# Patient Record
Sex: FEMALE | Race: WHITE | NOT HISPANIC OR LATINO | Employment: FULL TIME | URBAN - METROPOLITAN AREA
[De-identification: names, ages, dates, MRNs, and addresses within clinical notes are randomized per-mention and may not be internally consistent; named-entity substitution may affect disease eponyms.]

---

## 2024-02-12 ENCOUNTER — OFFICE VISIT (OUTPATIENT)
Dept: ENDOCRINOLOGY | Facility: CLINIC | Age: 39
End: 2024-02-12
Payer: COMMERCIAL

## 2024-02-12 VITALS
SYSTOLIC BLOOD PRESSURE: 183 MMHG | WEIGHT: 293 LBS | DIASTOLIC BLOOD PRESSURE: 100 MMHG | HEIGHT: 67 IN | HEART RATE: 97 BPM | BODY MASS INDEX: 45.99 KG/M2

## 2024-02-12 DIAGNOSIS — I10 HYPERTENSION, UNSPECIFIED TYPE: ICD-10-CM

## 2024-02-12 DIAGNOSIS — E66.01 CLASS 3 SEVERE OBESITY DUE TO EXCESS CALORIES WITH SERIOUS COMORBIDITY AND BODY MASS INDEX (BMI) OF 50.0 TO 59.9 IN ADULT (MULTI): ICD-10-CM

## 2024-02-12 DIAGNOSIS — E11.65 TYPE 2 DIABETES MELLITUS WITH HYPERGLYCEMIA, WITHOUT LONG-TERM CURRENT USE OF INSULIN (MULTI): Primary | ICD-10-CM

## 2024-02-12 PROCEDURE — 99204 OFFICE O/P NEW MOD 45 MIN: CPT | Performed by: NURSE PRACTITIONER

## 2024-02-12 PROCEDURE — 3077F SYST BP >= 140 MM HG: CPT | Performed by: NURSE PRACTITIONER

## 2024-02-12 PROCEDURE — 4010F ACE/ARB THERAPY RXD/TAKEN: CPT | Performed by: NURSE PRACTITIONER

## 2024-02-12 PROCEDURE — 95249 CONT GLUC MNTR PT PROV EQP: CPT | Performed by: NURSE PRACTITIONER

## 2024-02-12 PROCEDURE — 3008F BODY MASS INDEX DOCD: CPT | Performed by: NURSE PRACTITIONER

## 2024-02-12 PROCEDURE — 3080F DIAST BP >= 90 MM HG: CPT | Performed by: NURSE PRACTITIONER

## 2024-02-12 RX ORDER — LOSARTAN POTASSIUM 25 MG/1
25 TABLET ORAL DAILY
COMMUNITY
End: 2024-02-12 | Stop reason: SDUPTHER

## 2024-02-12 RX ORDER — LOSARTAN POTASSIUM 25 MG/1
25 TABLET ORAL DAILY
Qty: 90 TABLET | Refills: 3 | Status: SHIPPED | OUTPATIENT
Start: 2024-02-12

## 2024-02-12 RX ORDER — METFORMIN HYDROCHLORIDE 1000 MG/1
1000 TABLET ORAL 2 TIMES DAILY
COMMUNITY
End: 2024-03-04 | Stop reason: SDUPTHER

## 2024-02-12 RX ORDER — BLOOD-GLUCOSE SENSOR
EACH MISCELLANEOUS
Qty: 2 EACH | Refills: 11 | Status: SHIPPED | OUTPATIENT
Start: 2024-02-12

## 2024-02-12 RX ORDER — BLOOD SUGAR DIAGNOSTIC
STRIP MISCELLANEOUS
Qty: 150 STRIP | Refills: 11 | Status: SHIPPED | OUTPATIENT
Start: 2024-02-12

## 2024-02-12 RX ORDER — SEMAGLUTIDE 0.68 MG/ML
0.5 INJECTION, SOLUTION SUBCUTANEOUS
Qty: 3 ML | Refills: 3 | Status: SHIPPED | OUTPATIENT
Start: 2024-02-12

## 2024-02-12 ASSESSMENT — ENCOUNTER SYMPTOMS
WEAKNESS: 0
DIZZINESS: 0
SLEEP DISTURBANCE: 0
FATIGUE: 0
POLYPHAGIA: 0
CONSTIPATION: 0
SHORTNESS OF BREATH: 0
POLYDIPSIA: 0
APPETITE CHANGE: 0
FREQUENCY: 0
NAUSEA: 0
PALPITATIONS: 0
SEIZURES: 0
NUMBNESS: 0
ACTIVITY CHANGE: 0
DIARRHEA: 0
NERVOUS/ANXIOUS: 0

## 2024-02-12 NOTE — PATIENT INSTRUCTIONS
Type 2 diabetes mellitus, is not at goal. A1C >12% at [facility] today.     RX changes:   CONTINUE metformin 1000 mg twice daily  START OZEMPIC 0.25 mg weekly for 4 weeks then increase to 0.5 mg weekly as tolerated.   START JARDIANCE 10 mg daily  START AMY 3. She has them at home and has picked up from the pharmacy, but not started. Initiated in office today. Discussed when to use meter, when to call for support, linked to clinic, turned off alarms until follow up appointment.   Education:  blood sugar goals and Need for updated labs  Follow up: I recommend diabetes care be 2 months with myself

## 2024-02-12 NOTE — PROGRESS NOTES
"Subjective   Andria Ann is a 38 y.o. female who presents for initial visit for evaluation of Type 2 diabetes mellitus. The initial diagnosis of diabetes was made  age 34 .     Known complications due to diabetes included obesity    Cardiovascular risk factors include diabetes mellitus, obesity (BMI >= 30 kg/m2), and sedentary lifestyle. The patient is on an ACE inhibitor or angiotensin II receptor blocker.     Current diabetes regimen is as follows:   Metformin 1000 mg twice daily    The patient is currently checking the blood glucose 3 times per day.  Patient is using: glucometer; Reports fasting 300 mg/dl     Hypoglycemia frequency: none  Hypoglycemia awareness: Yes     Exercise: never   Meal panning: She is using no plan.    Review of Systems   Constitutional:  Negative for activity change, appetite change and fatigue.   Respiratory:  Negative for shortness of breath.    Cardiovascular:  Negative for chest pain, palpitations and leg swelling.   Gastrointestinal:  Negative for constipation, diarrhea and nausea.   Endocrine: Negative for cold intolerance, heat intolerance, polydipsia, polyphagia and polyuria.   Genitourinary:  Negative for frequency.   Musculoskeletal:  Negative for gait problem.   Skin:  Negative for rash.   Neurological:  Negative for dizziness, seizures, weakness and numbness.   Psychiatric/Behavioral:  Negative for sleep disturbance and suicidal ideas. The patient is not nervous/anxious.      Objective   BP (!) 183/100 (BP Location: Right arm, Patient Position: Sitting, BP Cuff Size: Adult)   Pulse 97   Ht 1.702 m (5' 7\")   Wt (!) 167 kg (368 lb)   BMI 57.64 kg/m²   Physical Exam  Vitals and nursing note reviewed.   Constitutional:       Appearance: She is obese.   HENT:      Head: Atraumatic.   Pulmonary:      Effort: Pulmonary effort is normal.   Skin:     General: Skin is warm.   Neurological:      General: No focal deficit present.      Mental Status: She is alert and oriented to " person, place, and time. Mental status is at baseline.      Gait: Gait normal.   Psychiatric:         Mood and Affect: Mood normal.         Behavior: Behavior normal.         Thought Content: Thought content normal.         Judgment: Judgment normal.     Health Maintenance:   Foot Exam: None  Eye Exam: She is overdue for exam, denies retinopathy   Lipid Panel: Overdue ordered today  Urine Albumin: Overdue ordered today     Assessment/Plan   Diagnoses and all orders for this visit:  Type 2 diabetes mellitus with hyperglycemia, without long-term current use of insulin (CMS/[facility])  -     Comprehensive metabolic panel; Future  -     Lipid panel; Future  -     Albumin , Urine Random; Future  -     semaglutide (Ozempic) 0.25 mg or 0.5 mg (2 mg/3 mL) pen injector; Inject 0.5 mg under the skin every 7 days.  -     empagliflozin (Jardiance) 10 mg; Take 1 tablet (10 mg) by mouth once daily.  -     FreeStyle Levon 3 Sensor device; Use to check glucose in real time changing every 14 days  -     C-peptide; Future  -     blood sugar diagnostic (Accu-Chek Guide test strips) strip; Use as directed when CGM is in warm up or malfunctioning 4 times per day  Hypertension, unspecified type  -     losartan (Cozaar) 25 mg tablet; Take 1 tablet (25 mg) by mouth once daily.    Type 2 diabetes mellitus, is not at goal. A1C >12% at [facility] today.     RX changes:   CONTINUE metformin 1000 mg twice daily  START OZEMPIC 0.25 mg weekly for 4 weeks then increase to 0.5 mg weekly as tolerated.   START JARDIANCE 10 mg daily  START LEVON 3. She has them at home and has picked up from the pharmacy, but not started. Initiated in office today. Discussed when to use meter, when to call for support, linked to clinic, turned off alarms until follow up appointment.   Education:  blood sugar goals and Need for updated labs  Hypertension: She has run out of Losartan for a week. Refilled today. She has BP cuff at home and states when she is medicating she  has 125-130/75-82. She has been instructed to restart medication for a week, then start measuring and report if >140/85  Follow up: I recommend diabetes care be 2 months with myself

## 2024-03-02 ENCOUNTER — PATIENT MESSAGE (OUTPATIENT)
Dept: ENDOCRINOLOGY | Facility: CLINIC | Age: 39
End: 2024-03-02

## 2024-03-02 DIAGNOSIS — E11.65 TYPE 2 DIABETES MELLITUS WITH HYPERGLYCEMIA, WITHOUT LONG-TERM CURRENT USE OF INSULIN (MULTI): ICD-10-CM

## 2024-03-02 DIAGNOSIS — E22.0 ACROMEGALY (MULTI): Primary | ICD-10-CM

## 2024-03-04 RX ORDER — METFORMIN HYDROCHLORIDE 1000 MG/1
1000 TABLET ORAL 2 TIMES DAILY
Qty: 180 TABLET | Refills: 0 | Status: SHIPPED | OUTPATIENT
Start: 2024-03-04 | End: 2024-05-29 | Stop reason: SDUPTHER

## 2024-03-04 NOTE — TELEPHONE ENCOUNTER
From: Andria Ann  To: Loraine Rocha, APRN-CNP  Sent: 3/2/2024 12:35 PM EST  Subject: Pharmacy didn’t receive prescription     I’ve tried to go to the pharmacy twice to get my metformin prescription and both times they told me they never received it. Is it possible to send the prescription to them again?

## 2024-04-24 ENCOUNTER — APPOINTMENT (OUTPATIENT)
Dept: ENDOCRINOLOGY | Facility: CLINIC | Age: 39
End: 2024-04-24

## 2024-05-22 ENCOUNTER — OFFICE VISIT (OUTPATIENT)
Dept: ENDOCRINOLOGY | Facility: CLINIC | Age: 39
End: 2024-05-22
Payer: COMMERCIAL

## 2024-05-22 VITALS
WEIGHT: 293 LBS | SYSTOLIC BLOOD PRESSURE: 168 MMHG | RESPIRATION RATE: 20 BRPM | HEART RATE: 98 BPM | BODY MASS INDEX: 45.99 KG/M2 | DIASTOLIC BLOOD PRESSURE: 90 MMHG | TEMPERATURE: 97.7 F | HEIGHT: 67 IN

## 2024-05-22 DIAGNOSIS — E11.65 TYPE 2 DIABETES MELLITUS WITH HYPERGLYCEMIA, WITHOUT LONG-TERM CURRENT USE OF INSULIN (MULTI): Primary | ICD-10-CM

## 2024-05-22 DIAGNOSIS — I10 HYPERTENSION, UNSPECIFIED TYPE: ICD-10-CM

## 2024-05-22 DIAGNOSIS — E66.01 CLASS 3 SEVERE OBESITY DUE TO EXCESS CALORIES WITH SERIOUS COMORBIDITY AND BODY MASS INDEX (BMI) OF 50.0 TO 59.9 IN ADULT (MULTI): ICD-10-CM

## 2024-05-22 LAB — POC HEMOGLOBIN A1C: 10.1 % (ref 4.2–6.5)

## 2024-05-22 PROCEDURE — 3080F DIAST BP >= 90 MM HG: CPT | Performed by: NURSE PRACTITIONER

## 2024-05-22 PROCEDURE — 95251 CONT GLUC MNTR ANALYSIS I&R: CPT | Performed by: NURSE PRACTITIONER

## 2024-05-22 PROCEDURE — 3077F SYST BP >= 140 MM HG: CPT | Performed by: NURSE PRACTITIONER

## 2024-05-22 PROCEDURE — 4010F ACE/ARB THERAPY RXD/TAKEN: CPT | Performed by: NURSE PRACTITIONER

## 2024-05-22 PROCEDURE — 99214 OFFICE O/P EST MOD 30 MIN: CPT | Performed by: NURSE PRACTITIONER

## 2024-05-22 PROCEDURE — 1036F TOBACCO NON-USER: CPT | Performed by: NURSE PRACTITIONER

## 2024-05-22 PROCEDURE — 83036 HEMOGLOBIN GLYCOSYLATED A1C: CPT | Performed by: NURSE PRACTITIONER

## 2024-05-22 PROCEDURE — 3008F BODY MASS INDEX DOCD: CPT | Performed by: NURSE PRACTITIONER

## 2024-05-22 ASSESSMENT — ENCOUNTER SYMPTOMS
POLYDIPSIA: 0
SHORTNESS OF BREATH: 0
POLYPHAGIA: 0
SLEEP DISTURBANCE: 0
SEIZURES: 0
NAUSEA: 0
WEAKNESS: 0
ACTIVITY CHANGE: 0
DIZZINESS: 0
FATIGUE: 0
NERVOUS/ANXIOUS: 0
CONSTIPATION: 0
NUMBNESS: 0
PALPITATIONS: 0
APPETITE CHANGE: 0
FREQUENCY: 0
DIARRHEA: 0

## 2024-05-22 NOTE — PROGRESS NOTES
"Subjective   Andria Ann is a 38 y.o. female who presents for follow up visit for evaluation of Type 2 diabetes mellitus. The initial diagnosis of diabetes was made  age 34 .     Known complications due to diabetes included obesity    Cardiovascular risk factors include diabetes mellitus, obesity (BMI >= 30 kg/m2), and sedentary lifestyle. The patient is on an ACE inhibitor or angiotensin II receptor blocker.     Current diabetes regimen is as follows:   Metformin 1000 mg twice daily  Jardiance 10 mg daily  Ozempic: Not taking as it \"freaked her out\". States she has heard about really bad side effects and concern about taking it for lifetime.     States since her last visit she was congested one day and took 3 sudafed over a course of one day and woke up with chest pain and racing heart. States all testing was negative.     Established care with primary, Marcela Beasley. Seen last week.     The patient is currently checking the blood glucose 3 times per day.  Patient is using: glucometer; Reports fasting 300 mg/dl     Hypoglycemia frequency: none  Hypoglycemia awareness: Yes     Exercise: never   Meal panning: She is using no plan.    Review of Systems   Constitutional:  Negative for activity change, appetite change and fatigue.   Respiratory:  Negative for shortness of breath.    Cardiovascular:  Negative for chest pain, palpitations and leg swelling.   Gastrointestinal:  Negative for constipation, diarrhea and nausea.   Endocrine: Negative for cold intolerance, heat intolerance, polydipsia, polyphagia and polyuria.   Genitourinary:  Negative for frequency.   Musculoskeletal:  Negative for gait problem.   Skin:  Negative for rash.   Neurological:  Negative for dizziness, seizures, weakness and numbness.   Psychiatric/Behavioral:  Negative for sleep disturbance and suicidal ideas. The patient is not nervous/anxious.      Objective   /90 (BP Location: Left arm, Patient Position: Sitting, BP Cuff Size: Large " "adult)   Pulse 98   Temp 36.5 °C (97.7 °F) (Tympanic)   Resp 20   Ht 1.702 m (5' 7\")   Wt (!) 157 kg (346 lb 12.8 oz)   BMI 54.32 kg/m²   Physical Exam  Vitals and nursing note reviewed.   Constitutional:       Appearance: She is obese.   HENT:      Head: Atraumatic.   Pulmonary:      Effort: Pulmonary effort is normal.   Skin:     General: Skin is warm.   Neurological:      General: No focal deficit present.      Mental Status: She is alert and oriented to person, place, and time. Mental status is at baseline.      Gait: Gait normal.   Psychiatric:         Mood and Affect: Mood normal.         Behavior: Behavior normal.         Thought Content: Thought content normal.         Judgment: Judgment normal.       Health Maintenance:   Foot Exam: None  Eye Exam: She is overdue for exam. Instructed to schedule appointment.   Lipid Panel: Overdue, did not get labs done.   Urine Albumin: Overdue, did not get labs done.     Assessment/Plan   Diagnoses and all orders for this visit:  Type 2 diabetes mellitus with hyperglycemia, without long-term current use of insulin ([facility])  -     POCT glycosylated hemoglobin (Hb A1C) manually resulted    Type 2 diabetes mellitus, is not at goal. A1C 10.1%    RX changes:   CONTINUE metformin 1000 mg twice daily  Continue JARDIANCE 10 mg daily  Start Ozempic 0.25 mg weekly.   We dicussed titration at smaller doses:   18 clicks 0.125 mg weekly for 4 weeks, then 0.25 mg for 4 weeks, then 54 clicks, or 0.375 mg weekly for 4 weeks, then 0.5 mg weekly. There are 2 higher doses, 1 and 2 mg weekly we can increase to in the future.   If you decide you do not want to take ozempic we will start insulin. Please use [employer] to let me know so that we are not waiting for the next appointment as your glucoses are still very high.  Continue AMY 3.  Education:  blood sugar goals and Need for updated labs  Hypertension: Now taking 50 mg losartan through primary care. She states her home BP running low " 140/82. States she will have another evaluation at primary in 1 month.   Follow up: I recommend diabetes care be 4 months with myself

## 2024-05-22 NOTE — PATIENT INSTRUCTIONS
Type 2 diabetes mellitus, is not at goal. A1C 10.1%    RX changes:   CONTINUE metformin 1000 mg twice daily  Continue JARDIANCE 10 mg daily  Start Ozempic 0.25 mg weekly.   We dicussed titration at smaller doses:   18 clicks 0.125 mg weekly for 4 weeks, then 0.25 mg for 4 weeks, then 54 clicks, or 0.375 mg weekly for 4 weeks, then 0.5 mg weekly. There are 2 higher doses, 1 and 2 mg weekly we can increase to in the future.   If you decide you do not want to take ozempic we will start insulin. Please use [employer] to let me know so that we are not waiting for the next appointment as your glucoses are still very high.  Continue AMY 3.  Education:  blood sugar goals and Need for updated labs  Hypertension: Now taking 50 mg losartan through primary care. She states her home BP running low 140/82. States she will have another evaluation at primary in 1 month.   Follow up: I recommend diabetes care be 4 months with myself

## 2024-05-29 ENCOUNTER — PATIENT MESSAGE (OUTPATIENT)
Dept: ENDOCRINOLOGY | Facility: CLINIC | Age: 39
End: 2024-05-29
Payer: COMMERCIAL

## 2024-05-29 DIAGNOSIS — E11.65 TYPE 2 DIABETES MELLITUS WITH HYPERGLYCEMIA, WITHOUT LONG-TERM CURRENT USE OF INSULIN (MULTI): ICD-10-CM

## 2024-05-29 RX ORDER — METFORMIN HYDROCHLORIDE 1000 MG/1
1000 TABLET ORAL 2 TIMES DAILY
Qty: 180 TABLET | Refills: 3 | Status: SHIPPED | OUTPATIENT
Start: 2024-05-29

## 2024-08-30 ENCOUNTER — APPOINTMENT (OUTPATIENT)
Dept: ENDOCRINOLOGY | Facility: CLINIC | Age: 39
End: 2024-08-30
Payer: COMMERCIAL

## 2024-10-03 ENCOUNTER — APPOINTMENT (OUTPATIENT)
Dept: ENDOCRINOLOGY | Facility: CLINIC | Age: 39
End: 2024-10-03
Payer: COMMERCIAL

## 2024-11-11 ENCOUNTER — APPOINTMENT (OUTPATIENT)
Dept: ENDOCRINOLOGY | Facility: CLINIC | Age: 39
End: 2024-11-11
Payer: COMMERCIAL

## 2025-02-06 ENCOUNTER — APPOINTMENT (OUTPATIENT)
Dept: ENDOCRINOLOGY | Facility: CLINIC | Age: 40
End: 2025-02-06
Payer: COMMERCIAL

## 2025-03-19 DIAGNOSIS — E11.65 TYPE 2 DIABETES MELLITUS WITH HYPERGLYCEMIA, WITHOUT LONG-TERM CURRENT USE OF INSULIN: ICD-10-CM

## 2025-03-20 RX ORDER — EMPAGLIFLOZIN 10 MG/1
10 TABLET, FILM COATED ORAL DAILY
Qty: 90 TABLET | Refills: 0 | OUTPATIENT
Start: 2025-03-20

## 2025-05-29 ENCOUNTER — APPOINTMENT (OUTPATIENT)
Dept: ENDOCRINOLOGY | Facility: CLINIC | Age: 40
End: 2025-05-29
Payer: COMMERCIAL

## 2025-06-30 ENCOUNTER — APPOINTMENT (OUTPATIENT)
Dept: ENDOCRINOLOGY | Facility: CLINIC | Age: 40
End: 2025-06-30
Payer: COMMERCIAL

## 2025-06-30 DIAGNOSIS — I10 HYPERTENSION, UNSPECIFIED TYPE: ICD-10-CM

## 2025-06-30 DIAGNOSIS — E66.813 CLASS 3 SEVERE OBESITY DUE TO EXCESS CALORIES WITH SERIOUS COMORBIDITY AND BODY MASS INDEX (BMI) OF 50.0 TO 59.9 IN ADULT: ICD-10-CM

## 2025-06-30 DIAGNOSIS — E11.65 TYPE 2 DIABETES MELLITUS WITH HYPERGLYCEMIA, WITHOUT LONG-TERM CURRENT USE OF INSULIN: Primary | ICD-10-CM

## 2025-06-30 PROCEDURE — 4010F ACE/ARB THERAPY RXD/TAKEN: CPT | Performed by: NURSE PRACTITIONER

## 2025-06-30 PROCEDURE — 95251 CONT GLUC MNTR ANALYSIS I&R: CPT | Performed by: NURSE PRACTITIONER

## 2025-06-30 PROCEDURE — 99214 OFFICE O/P EST MOD 30 MIN: CPT | Performed by: NURSE PRACTITIONER

## 2025-06-30 ASSESSMENT — ENCOUNTER SYMPTOMS
NERVOUS/ANXIOUS: 0
PALPITATIONS: 0
FATIGUE: 0
POLYDIPSIA: 0
DIZZINESS: 0
DIARRHEA: 0
APPETITE CHANGE: 0
SLEEP DISTURBANCE: 0
NAUSEA: 0
FREQUENCY: 0
SHORTNESS OF BREATH: 0
WEAKNESS: 0
POLYPHAGIA: 0
ACTIVITY CHANGE: 0
SEIZURES: 0
NUMBNESS: 0
CONSTIPATION: 0

## 2025-06-30 NOTE — PATIENT INSTRUCTIONS
Type 2 diabetes mellitus, is not at goal. A1C 8.4%    RX changes:   CONTINUE metformin 1000 mg twice daily  Continue JARDIANCE 25 mg daily  Start Mounjaro 2.5 mg weekly or Ozempic 0.25 mg weekly  Please use MyChart to let me know so that we are not waiting for the next appointment as your glucoses are still very high.  If you are not interested in weekly GLP1 then we will need to insulin.   Continue AMY 3+  Education:  blood sugar goals and Need for updated labs  Hypertension: Now taking 50 mg losartan through primary care. She states her home BP running low 124-135/65-75.  Follow up: I recommend diabetes care be 4 months with myself

## 2025-06-30 NOTE — PROGRESS NOTES
Subjective   Andria Ann is a 39 y.o. female who presents for follow up visit for evaluation of Type 2 diabetes mellitus. The initial diagnosis of diabetes was made age 34.     Known complications due to diabetes included obesity    Cardiovascular risk factors include diabetes mellitus, obesity (BMI >= 30 kg/m2), and sedentary lifestyle. The patient is on an ACE inhibitor or angiotensin II receptor blocker.     Current diabetes regimen is as follows:   Metformin 1000 mg twice daily  Jardiance 25 mg daily    Established care with primary, Marcela Beasley. States she had A1C of 8.8% 1 months ago with primary care.     The patient is currently checking the blood glucose 3 times per day.  Patient is using: glucometer;     Hypoglycemia frequency: none  Hypoglycemia awareness: Yes     Exercise: never   Meal panning: She is using no plan.    Review of Systems   Constitutional:  Negative for activity change, appetite change and fatigue.   Respiratory:  Negative for shortness of breath.    Cardiovascular:  Negative for chest pain, palpitations and leg swelling.   Gastrointestinal:  Negative for constipation, diarrhea and nausea.   Endocrine: Negative for cold intolerance, heat intolerance, polydipsia, polyphagia and polyuria.   Genitourinary:  Negative for frequency.   Musculoskeletal:  Negative for gait problem.   Skin:  Negative for rash.   Neurological:  Negative for dizziness, seizures, weakness and numbness.   Psychiatric/Behavioral:  Negative for sleep disturbance and suicidal ideas. The patient is not nervous/anxious.    Objective   There were no vitals taken for this visit.  Physical Exam  Vitals and nursing note reviewed.   Constitutional:       Appearance: She is obese.   HENT:      Head: Atraumatic.   Pulmonary:      Effort: Pulmonary effort is normal.   Skin:     General: Skin is warm.   Neurological:      General: No focal deficit present.      Mental Status: She is alert and oriented to person, place, and  time. Mental status is at baseline.      Gait: Gait normal.   Psychiatric:         Mood and Affect: Mood normal.         Behavior: Behavior normal.         Thought Content: Thought content normal.         Judgment: Judgment normal.       Health Maintenance:   Foot Exam: None  Eye Exam: She is overdue for exam. Instructed to schedule appointment.   Lipid Panel: Overdue, did not get labs done.   Urine Albumin: Overdue, did not get labs done.     Assessment/Plan   Diagnoses and all orders for this visit:  Type 2 diabetes mellitus with hyperglycemia, without long-term current use of insulin  Hypertension, unspecified type  Class 3 severe obesity due to excess calories with serious comorbidity and body mass index (BMI) of 50.0 to 59.9 in adult    Type 2 diabetes mellitus, is not at goal. A1C 8.4%  RX changes:   CONTINUE metformin 1000 mg twice daily  Continue JARDIANCE 25 mg daily  Start Mounjaro 2.5 mg weekly or Ozempic 0.25 mg weekly. Check with insurance.   Please use [employer] to let me know so that we are not waiting for the next appointment as your glucoses are still very high.  If you are not interested in weekly GLP1 then we will need to insulin.   Continue AMY 3+  Education:  blood sugar goals and Need for updated labs  Hypertension: Now taking 50 mg losartan through primary care. She states her home BP running low 124-135/65-75.  Follow up: I recommend diabetes care be 4 months with myself

## 2026-01-21 ENCOUNTER — APPOINTMENT (OUTPATIENT)
Dept: ENDOCRINOLOGY | Facility: CLINIC | Age: 41
End: 2026-01-21
Payer: COMMERCIAL